# Patient Record
Sex: FEMALE | Race: OTHER | HISPANIC OR LATINO | URBAN - METROPOLITAN AREA
[De-identification: names, ages, dates, MRNs, and addresses within clinical notes are randomized per-mention and may not be internally consistent; named-entity substitution may affect disease eponyms.]

---

## 2020-08-18 ENCOUNTER — APPOINTMENT (RX ONLY)
Dept: URBAN - METROPOLITAN AREA CLINIC 15 | Facility: CLINIC | Age: 30
Setting detail: DERMATOLOGY
End: 2020-08-18

## 2020-08-18 DIAGNOSIS — Z41.9 ENCOUNTER FOR PROCEDURE FOR PURPOSES OTHER THAN REMEDYING HEALTH STATE, UNSPECIFIED: ICD-10-CM

## 2020-08-18 PROCEDURE — ? MEDICAL CONSULTATION: FILLERS

## 2020-08-18 PROCEDURE — ? ADDITIONAL NOTES

## 2020-08-18 NOTE — HPI: COSMETIC CONSULTATION
Additional History: Patient had her lip fillers done and would like to know the reason why they arenât more plump.

## 2020-08-18 NOTE — PROCEDURE: ADDITIONAL NOTES
Additional Notes: Harry Aponte \\nWash face with a Gentle Cleanser every morning. \\nSkinceutical Blemish + Age Defense every morning \\nSunscreen daily ( Mineral: Zinc Oxide and Titanium Dioxide with SPF 30-50). \\n\\nNight \\nWash face with a Gentle Cleanser every night \\nSkinceutical Retinol 1.0 every night.
Detail Level: Zone

## 2020-08-24 ENCOUNTER — APPOINTMENT (RX ONLY)
Dept: URBAN - METROPOLITAN AREA CLINIC 15 | Facility: CLINIC | Age: 30
Setting detail: DERMATOLOGY
End: 2020-08-24

## 2020-08-24 DIAGNOSIS — Z41.9 ENCOUNTER FOR PROCEDURE FOR PURPOSES OTHER THAN REMEDYING HEALTH STATE, UNSPECIFIED: ICD-10-CM

## 2020-08-24 PROCEDURE — ? BOTOX

## 2020-08-24 PROCEDURE — ? FILLERS

## 2020-08-24 PROCEDURE — ? ADDITIONAL NOTES

## 2020-08-24 ASSESSMENT — LOCATION DETAILED DESCRIPTION DERM
LOCATION DETAILED: GLABELLA
LOCATION DETAILED: LEFT INFERIOR FOREHEAD
LOCATION DETAILED: LEFT LATERAL FOREHEAD
LOCATION DETAILED: RIGHT INFERIOR FOREHEAD
LOCATION DETAILED: INFERIOR MID FOREHEAD
LOCATION DETAILED: RIGHT MEDIAL FOREHEAD
LOCATION DETAILED: RIGHT LATERAL FOREHEAD

## 2020-08-24 ASSESSMENT — LOCATION SIMPLE DESCRIPTION DERM
LOCATION SIMPLE: GLABELLA
LOCATION SIMPLE: INFERIOR FOREHEAD
LOCATION SIMPLE: LEFT FOREHEAD
LOCATION SIMPLE: RIGHT FOREHEAD

## 2020-08-24 ASSESSMENT — LOCATION ZONE DERM: LOCATION ZONE: FACE

## 2020-08-24 NOTE — PROCEDURE: FILLERS
Lot #: [id number]
Tear Troughs Filler  Volume In Cc: 0
Additional Area 1 Location: lip
Additional Area 5 Location: left tear through
Additional Area 2 Location: nose
Detail Level: Zone
Tear Troughs Filler  Volume In Cc: 1
Lot #: [id number]
Include Cannula Length?: 1.5 inch
Additional Area 3 Location: corner of the mouth
Additional Area 1 Location: lips
Price (Use Numbers Only, No Special Characters Or $): [address]
Include Cannula Information In Note?: No
Expiration Date (Month Year): 2022-07-15
Additional Area 2 Location: right upper lip
Use Map Statement For Sites (Optional): Yes
Consent: Written consent obtained. Risks include but not limited to bruising, beading, irregular texture, ulceration, infection, allergic reaction, scar formation, incomplete augmentation, temporary nature, procedural pain.
Include Cannula Size?: 25G
Post-Care Instructions: Patient instructed to apply ice to reduce swelling and return with any issues.
Map Statment: See [address] for Complete Details
Lot #: [id number]
Include Cannula Brand?: Derma[employer]culpt
Expiration Date (Month Year): 2021-09-27
Filler: Voluma
Additional Area 2 Location: Left Nasolabial folds
Expiration Date (Month Year): 08/15/2021
Additional Area 3 Location: chin

## 2020-08-24 NOTE — HPI: COSMETIC PROCEDURE
Additional History: Patient is here to have Voluma injected to her under eyes. Patient was quoted last visit.

## 2020-08-24 NOTE — PROCEDURE: BOTOX
Show Anterior Platysmal Band Units: Yes
Detail Level: Zone
Right Periorbital Units: 0
Dilution (U/0.1 Cc): 2
Show Right And Left Pupillary Line Units: No
Post-Care Instructions: Patient instructed to not lie down for 4 hours and limit physical activity for 24 hours. Patient instructed not to travel by airplane for 48 hours.
Forehead Units: 25
Additional Area 1 Location: chin
Expiration Date (Month Year): 04/2023
Lot #: [id number]
Consent: Written consent obtained. Risks include but not limited to lid/brow ptosis, bruising, swelling, diplopia, temporary effect, incomplete chemical denervation.